# Patient Record
Sex: FEMALE | Race: WHITE | NOT HISPANIC OR LATINO | Employment: OTHER | URBAN - METROPOLITAN AREA
[De-identification: names, ages, dates, MRNs, and addresses within clinical notes are randomized per-mention and may not be internally consistent; named-entity substitution may affect disease eponyms.]

---

## 2023-02-03 PROBLEM — D64.9 ANEMIA: Status: ACTIVE | Noted: 2023-02-03

## 2023-02-03 PROBLEM — R91.1 PULMONARY NODULE: Status: ACTIVE | Noted: 2023-02-03

## 2023-02-03 PROBLEM — I07.1 TRICUSPID REGURGITATION: Status: ACTIVE | Noted: 2023-02-03

## 2023-02-03 PROBLEM — T14.8XXA HEMATOMA: Status: ACTIVE | Noted: 2023-02-03

## 2023-02-03 PROBLEM — E03.9 HYPOTHYROIDISM: Status: ACTIVE | Noted: 2023-02-03

## 2023-02-03 PROBLEM — S52.91XD: Status: ACTIVE | Noted: 2023-02-03

## 2023-02-03 PROBLEM — E78.5 HYPERLIPIDEMIA: Status: ACTIVE | Noted: 2023-02-03

## 2023-02-03 PROBLEM — M17.9 OSTEOARTHRITIS OF KNEE: Status: ACTIVE | Noted: 2023-02-03

## 2023-02-03 PROBLEM — I10 HYPERTENSION: Status: ACTIVE | Noted: 2023-02-03

## 2023-02-03 PROBLEM — J18.9 COMMUNITY ACQUIRED PNEUMONIA: Status: ACTIVE | Noted: 2023-02-03

## 2023-02-03 PROBLEM — M54.16 LUMBAR RADICULOPATHY: Status: ACTIVE | Noted: 2023-02-03

## 2023-02-03 RX ORDER — AMLODIPINE BESYLATE 2.5 MG/1
3 TABLET ORAL DAILY
COMMUNITY
Start: 2017-09-14 | End: 2023-03-17 | Stop reason: SDUPTHER

## 2023-02-03 RX ORDER — LEVOTHYROXINE SODIUM 50 UG/1
1 TABLET ORAL DAILY
COMMUNITY
Start: 2017-09-14 | End: 2023-03-17 | Stop reason: SDUPTHER

## 2023-03-17 ENCOUNTER — OFFICE VISIT (OUTPATIENT)
Dept: PRIMARY CARE | Facility: CLINIC | Age: 88
End: 2023-03-17
Payer: MEDICARE

## 2023-03-17 VITALS
TEMPERATURE: 97.9 F | HEIGHT: 62 IN | SYSTOLIC BLOOD PRESSURE: 147 MMHG | HEART RATE: 103 BPM | WEIGHT: 116 LBS | DIASTOLIC BLOOD PRESSURE: 74 MMHG | BODY MASS INDEX: 21.35 KG/M2

## 2023-03-17 DIAGNOSIS — I10 PRIMARY HYPERTENSION: ICD-10-CM

## 2023-03-17 DIAGNOSIS — R26.89 BALANCE DISORDER: ICD-10-CM

## 2023-03-17 DIAGNOSIS — E03.8 OTHER SPECIFIED HYPOTHYROIDISM: Primary | ICD-10-CM

## 2023-03-17 PROBLEM — J18.9 COMMUNITY ACQUIRED PNEUMONIA: Status: RESOLVED | Noted: 2023-02-03 | Resolved: 2023-03-17

## 2023-03-17 PROCEDURE — 1036F TOBACCO NON-USER: CPT | Performed by: INTERNAL MEDICINE

## 2023-03-17 PROCEDURE — 99214 OFFICE O/P EST MOD 30 MIN: CPT | Performed by: INTERNAL MEDICINE

## 2023-03-17 PROCEDURE — 3078F DIAST BP <80 MM HG: CPT | Performed by: INTERNAL MEDICINE

## 2023-03-17 PROCEDURE — 3077F SYST BP >= 140 MM HG: CPT | Performed by: INTERNAL MEDICINE

## 2023-03-17 PROCEDURE — 1157F ADVNC CARE PLAN IN RCRD: CPT | Performed by: INTERNAL MEDICINE

## 2023-03-17 PROCEDURE — 1159F MED LIST DOCD IN RCRD: CPT | Performed by: INTERNAL MEDICINE

## 2023-03-17 PROCEDURE — 1160F RVW MEDS BY RX/DR IN RCRD: CPT | Performed by: INTERNAL MEDICINE

## 2023-03-17 RX ORDER — AMLODIPINE BESYLATE 2.5 MG/1
7.5 TABLET ORAL DAILY
Qty: 270 TABLET | Refills: 3 | Status: SHIPPED | OUTPATIENT
Start: 2023-03-17 | End: 2024-04-30 | Stop reason: ALTCHOICE

## 2023-03-17 RX ORDER — CANE
EACH MISCELLANEOUS
Qty: 1 EACH | Refills: 0 | Status: SHIPPED | OUTPATIENT
Start: 2023-03-17

## 2023-03-17 RX ORDER — LEVOTHYROXINE SODIUM 50 UG/1
50 TABLET ORAL DAILY
Qty: 90 TABLET | Refills: 3 | Status: SHIPPED | OUTPATIENT
Start: 2023-03-17 | End: 2024-04-30 | Stop reason: SDUPTHER

## 2023-03-17 ASSESSMENT — ENCOUNTER SYMPTOMS
FEVER: 0
COUGH: 0
POLYDIPSIA: 0
OCCASIONAL FEELINGS OF UNSTEADINESS: 0
DEPRESSION: 0
SHORTNESS OF BREATH: 0
LOSS OF SENSATION IN FEET: 0
CHILLS: 0
PALPITATIONS: 0

## 2023-07-19 ENCOUNTER — OFFICE VISIT (OUTPATIENT)
Dept: PRIMARY CARE | Facility: CLINIC | Age: 88
End: 2023-07-19
Payer: MEDICARE

## 2023-07-19 VITALS
WEIGHT: 112 LBS | HEART RATE: 87 BPM | SYSTOLIC BLOOD PRESSURE: 156 MMHG | BODY MASS INDEX: 20.49 KG/M2 | TEMPERATURE: 97.9 F | DIASTOLIC BLOOD PRESSURE: 61 MMHG

## 2023-07-19 DIAGNOSIS — Z00.00 ANNUAL PHYSICAL EXAM: Primary | ICD-10-CM

## 2023-07-19 DIAGNOSIS — F40.298 FEAR OF FALLING: ICD-10-CM

## 2023-07-19 DIAGNOSIS — E03.8 OTHER SPECIFIED HYPOTHYROIDISM: ICD-10-CM

## 2023-07-19 DIAGNOSIS — D64.9 ANEMIA, UNSPECIFIED TYPE: ICD-10-CM

## 2023-07-19 DIAGNOSIS — R26.89 BALANCE DISORDER: ICD-10-CM

## 2023-07-19 DIAGNOSIS — I10 PRIMARY HYPERTENSION: ICD-10-CM

## 2023-07-19 DIAGNOSIS — E55.9 VITAMIN D DEFICIENCY: ICD-10-CM

## 2023-07-19 PROCEDURE — G0439 PPPS, SUBSEQ VISIT: HCPCS | Performed by: INTERNAL MEDICINE

## 2023-07-19 PROCEDURE — 1170F FXNL STATUS ASSESSED: CPT | Performed by: INTERNAL MEDICINE

## 2023-07-19 PROCEDURE — 1159F MED LIST DOCD IN RCRD: CPT | Performed by: INTERNAL MEDICINE

## 2023-07-19 PROCEDURE — 1160F RVW MEDS BY RX/DR IN RCRD: CPT | Performed by: INTERNAL MEDICINE

## 2023-07-19 PROCEDURE — 1036F TOBACCO NON-USER: CPT | Performed by: INTERNAL MEDICINE

## 2023-07-19 PROCEDURE — 3077F SYST BP >= 140 MM HG: CPT | Performed by: INTERNAL MEDICINE

## 2023-07-19 PROCEDURE — 3078F DIAST BP <80 MM HG: CPT | Performed by: INTERNAL MEDICINE

## 2023-07-19 PROCEDURE — 1157F ADVNC CARE PLAN IN RCRD: CPT | Performed by: INTERNAL MEDICINE

## 2023-07-19 RX ORDER — ATORVASTATIN CALCIUM 40 MG/1
TABLET, FILM COATED ORAL
COMMUNITY
Start: 2017-07-27 | End: 2023-07-19 | Stop reason: ALTCHOICE

## 2023-07-19 ASSESSMENT — ENCOUNTER SYMPTOMS
CHILLS: 0
LOSS OF SENSATION IN FEET: 0
OCCASIONAL FEELINGS OF UNSTEADINESS: 1
PALPITATIONS: 0
COUGH: 0
DEPRESSION: 0
FEVER: 0
SHORTNESS OF BREATH: 0
POLYDIPSIA: 0

## 2023-07-19 ASSESSMENT — ACTIVITIES OF DAILY LIVING (ADL)
TAKING_MEDICATION: INDEPENDENT
DOING_HOUSEWORK: NEEDS ASSISTANCE
DRESSING: INDEPENDENT
MANAGING_FINANCES: INDEPENDENT
BATHING: INDEPENDENT
GROCERY_SHOPPING: NEEDS ASSISTANCE

## 2023-07-19 ASSESSMENT — PATIENT HEALTH QUESTIONNAIRE - PHQ9: 1. LITTLE INTEREST OR PLEASURE IN DOING THINGS: NOT AT ALL

## 2023-07-19 NOTE — PROGRESS NOTES
Subjective   Reason for Visit: Lucita Chandra is an 89 y.o. female here for a Medicare Wellness visit.     Past Medical, Surgical, and Family History reviewed and updated in chart.    Reviewed all medications by prescribing practitioner or clinical pharmacist (such as prescriptions, OTCs, herbal therapies and supplements) and documented in the medical record.    Patient presents today for an annual wellness exam    Medical history and surgical history updated today  Medication list reconciled and updated  Patient denies vision issues at this time  Patient denies hearing issues at this time  Current diet: stable  Current exercise habit: minimal  Follows with a dentist: Yes    Patient counseled about available preventative health vaccinations and provided with opportunity to update them with our office or through prescription to preferred pharmacy.    Significant time spent in counseling of the patient about home safety measures.  She has not had a fall but has a fear of falling and multiple balance issues.  I advised her about home physical therapy given her homebound status lack of driving and reliance on outside transportation to leave the home.  I have ordered home physical therapy as part of today's assessment to assure that we would prevent the first fall and keep the patient as independent and safe at home as possible.  In addition to this discussion we discussed her sleep and nighttime awakening for urination she currently denies wanting to intervene on either of these issues through investigation or medication at this time.  The patient wants to take an approach that is more minimalistic in regards to her health care.  She wants to focus on independence safety and quality of life.  The patient does have family 2 daughters and a son who check on her regularly she has at least 2 phone calls a day checking up on her home safety by family and visits on a regular basis from her son when things are needed.   Family also does all grocery shopping.  They are regularly involved in her life.  I asked if she needed a home health aide or additional assistance at this time or to be directed to a  to discuss additional family and home services and she declined this referral and further assistance at this time.        Patient Care Team:  Vazquez Butts MD as PCP - General  Kishan Rendon MD as PCP - [employer] PCP     Review of Systems   Constitutional:  Negative for chills and fever.   Respiratory:  Negative for cough and shortness of breath.    Cardiovascular:  Negative for chest pain and palpitations.   Endocrine: Negative for polydipsia and polyuria.       Objective   Vitals:  /61   Pulse 87   Temp 36.6 °C (97.9 °F)   Wt 50.8 kg (112 lb)   BMI 20.49 kg/m²       Physical Exam  Constitutional:       Appearance: Normal appearance.   HENT:      Head: Normocephalic and atraumatic.      Right Ear: Tympanic membrane normal.      Left Ear: Tympanic membrane normal.      Nose: Nose normal.      Mouth/Throat:      Mouth: Mucous membranes are moist.   Eyes:      Extraocular Movements: Extraocular movements intact.      Conjunctiva/sclera: Conjunctivae normal.      Pupils: Pupils are equal, round, and reactive to light.   Neck:      Thyroid: No thyroid mass, thyromegaly or thyroid tenderness.      Vascular: No carotid bruit.   Cardiovascular:      Rate and Rhythm: Normal rate and regular rhythm.      Heart sounds: No murmur heard.     No friction rub. No gallop.   Pulmonary:      Effort: Pulmonary effort is normal. No respiratory distress.      Breath sounds: No wheezing, rhonchi or rales.   Abdominal:      General: Bowel sounds are normal.      Palpations: Abdomen is soft.      Tenderness: There is no abdominal tenderness. There is no guarding.   Musculoskeletal:      Cervical back: Neck supple. No tenderness.      Right lower leg: No edema.      Left lower leg: No edema.      Comments:  Reduced cervical lordosis. Exaggerated thoracic kyphosis   Lymphadenopathy:      Cervical: No cervical adenopathy.   Skin:     Coloration: Skin is not jaundiced.   Neurological:      General: No focal deficit present.      Mental Status: She is alert and oriented to person, place, and time.   Psychiatric:         Mood and Affect: Mood normal.         Assessment/Plan   Problem List Items Addressed This Visit       Anemia    Relevant Orders    Vitamin D, Total    CBC    Comprehensive Metabolic Panel    TSH with reflex to Free T4 if abnormal    Hypertension    Relevant Orders    Vitamin D, Total    CBC    Comprehensive Metabolic Panel    TSH with reflex to Free T4 if abnormal    Hypothyroidism    Relevant Orders    Vitamin D, Total    CBC    Comprehensive Metabolic Panel    TSH with reflex to Free T4 if abnormal     Other Visit Diagnoses       Annual physical exam    -  Primary    Balance disorder        Fear of falling        Vitamin D deficiency        Relevant Orders    Vitamin D, Total

## 2023-08-17 NOTE — PROGRESS NOTES
Subjective   Patient ID: Lucita Chandra is a 89 y.o. female who presents for No chief complaint on file..  HPI  89-year-old female patient of Dr. Butts here for follow-up visit concerned about rectal bleeding.  She was last seen by Dr. Lenz on 7/19.  - Seen by eye clinic noted to have retinal hemorrhage in the right, new onset, recommended consideration for management of diabetes or hypertension in the clinic. Appears to be a new finding as this was not present in January, recommended monitoring and followup in 2 months.       History  -HTN-on amlodipine 7.5mg/day   -Hypothyroidism-on levothyroxine 50 mcg  -Balance disorder  - Macular degeneration fo the left eye   Current Outpatient Medications   Medication Instructions    amLODIPine (NORVASC) 7.5 mg, oral, Daily    cane device Dispense 4 prong cane for ambulation.    levothyroxine (SYNTHROID, LEVOXYL) 50 mcg, oral, Daily        Objective     /56   Pulse 87   Temp 36.4 °C (97.6 °F)   Wt 52.2 kg (115 lb)   BMI 21.03 kg/m²     Physical Exam  General: Alert and oriented, in no apparent distress   HEENT: No conjunctival erythema, no external facial lesions   Lungs: Breathing comfortably  Skin: No evidence of skin breakdown.  Neuro: AAO x 3, answering questions appropriately, no obvious cranial nerve deficits    Assessment/Plan   Problem List Items Addressed This Visit    None  Visit Diagnoses       Retinal hemorrhage noted on examination of right eye    -  Primary  With as of yet uncertain etiology. Blood pressure readings WNL on repeat measurements and asymptomatic. No significant evidence of diabetes or prediabetes.   Will discuss further with optho regarding any additional etiologies that may warrant additional workup. Will followup with  otpho.     Healthcare maintenance        Relevant Orders    POCT glycosylated hemoglobin (Hb A1C) manually resulted (Completed)

## 2023-08-18 ENCOUNTER — OFFICE VISIT (OUTPATIENT)
Dept: PRIMARY CARE | Facility: CLINIC | Age: 88
End: 2023-08-18
Payer: MEDICARE

## 2023-08-18 VITALS
HEART RATE: 87 BPM | TEMPERATURE: 97.6 F | BODY MASS INDEX: 21.03 KG/M2 | SYSTOLIC BLOOD PRESSURE: 111 MMHG | DIASTOLIC BLOOD PRESSURE: 56 MMHG | WEIGHT: 115 LBS

## 2023-08-18 DIAGNOSIS — H35.61 RETINAL HEMORRHAGE NOTED ON EXAMINATION OF RIGHT EYE: Primary | ICD-10-CM

## 2023-08-18 DIAGNOSIS — I10 PRIMARY HYPERTENSION: ICD-10-CM

## 2023-08-18 DIAGNOSIS — Z00.00 HEALTHCARE MAINTENANCE: ICD-10-CM

## 2023-08-18 LAB — POC HEMOGLOBIN A1C: 5.7 % (ref 4.2–6.5)

## 2023-08-18 PROCEDURE — 83036 HEMOGLOBIN GLYCOSYLATED A1C: CPT | Performed by: INTERNAL MEDICINE

## 2023-08-18 PROCEDURE — 1157F ADVNC CARE PLAN IN RCRD: CPT | Performed by: INTERNAL MEDICINE

## 2023-08-18 PROCEDURE — 1159F MED LIST DOCD IN RCRD: CPT | Performed by: INTERNAL MEDICINE

## 2023-08-18 PROCEDURE — 3074F SYST BP LT 130 MM HG: CPT | Performed by: INTERNAL MEDICINE

## 2023-08-18 PROCEDURE — 99213 OFFICE O/P EST LOW 20 MIN: CPT | Performed by: INTERNAL MEDICINE

## 2023-08-18 PROCEDURE — 1160F RVW MEDS BY RX/DR IN RCRD: CPT | Performed by: INTERNAL MEDICINE

## 2023-08-18 PROCEDURE — 3078F DIAST BP <80 MM HG: CPT | Performed by: INTERNAL MEDICINE

## 2023-08-18 PROCEDURE — 1036F TOBACCO NON-USER: CPT | Performed by: INTERNAL MEDICINE

## 2023-08-18 NOTE — ASSESSMENT & PLAN NOTE
Blood pressure readings wnl and on the low side, may consider de-escalation in management in future visit if persists. Clinically asymptomatic.

## 2023-11-20 ENCOUNTER — APPOINTMENT (OUTPATIENT)
Dept: PRIMARY CARE | Facility: CLINIC | Age: 88
End: 2023-11-20
Payer: MEDICARE

## 2023-12-28 ENCOUNTER — OFFICE VISIT (OUTPATIENT)
Dept: PRIMARY CARE | Facility: CLINIC | Age: 88
End: 2023-12-28
Payer: MEDICARE

## 2023-12-28 VITALS
DIASTOLIC BLOOD PRESSURE: 77 MMHG | WEIGHT: 116.2 LBS | BODY MASS INDEX: 21.38 KG/M2 | HEART RATE: 83 BPM | HEIGHT: 62 IN | SYSTOLIC BLOOD PRESSURE: 149 MMHG

## 2023-12-28 DIAGNOSIS — M19.042 ARTHRITIS OF FINGER OF LEFT HAND: ICD-10-CM
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
## 2023-12-28 DIAGNOSIS — R26.89 BALANCE DISORDER: Primary | ICD-10-CM

## 2023-12-28 PROCEDURE — 3078F DIAST BP <80 MM HG: CPT | Performed by: INTERNAL MEDICINE

## 2023-12-28 PROCEDURE — 1160F RVW MEDS BY RX/DR IN RCRD: CPT | Performed by: INTERNAL MEDICINE

## 2023-12-28 PROCEDURE — 99214 OFFICE O/P EST MOD 30 MIN: CPT | Performed by: INTERNAL MEDICINE

## 2023-12-28 PROCEDURE — 1159F MED LIST DOCD IN RCRD: CPT | Performed by: INTERNAL MEDICINE

## 2023-12-28 PROCEDURE — 1036F TOBACCO NON-USER: CPT | Performed by: INTERNAL MEDICINE

## 2023-12-28 PROCEDURE — 3077F SYST BP >= 140 MM HG: CPT | Performed by: INTERNAL MEDICINE

## 2023-12-28 RX ORDER — DICLOFENAC SODIUM 10 MG/G
4 GEL TOPICAL 4 TIMES DAILY
Qty: 100 G | Refills: 1 | Status: SHIPPED | OUTPATIENT
Start: 2023-12-28 | End: 2024-05-16 | Stop reason: SDUPTHER

## 2023-12-28 ASSESSMENT — ENCOUNTER SYMPTOMS
PALPITATIONS: 0
CHILLS: 0
FEVER: 0
POLYDIPSIA: 0
COUGH: 0
SHORTNESS OF BREATH: 0

## 2023-12-28 NOTE — PATIENT INSTRUCTIONS
Please consider the Life alert we discussed today. It would be excellent for your safety at home.     Please consider letting family hire someone to tighten your stair railing.     I have ordered physical therapy for you, for coming to help to help balance and prevent future falls..     Please consider the COVID 19 and RSV vaccine booster from your pharmacy.

## 2023-12-28 NOTE — PROGRESS NOTES
Subjective   Patient ID: Lucita Chandra is a 89 y.o. female who presents for Follow-up.    89-year-old female presents today for general follow-up.  Since her last encounter there have been no major health changes she has not been to the hospital emergency room or seeing the new physician.  No surgeries or procedures.  She continues to live alone the family checking on her regularly.  She does not have a life alert system in place.  She was advised about the benefits of this for home safety and recommended strongly to consider 1.  She does carry a cell phone around but is not on her 24/7.  We know this because she had a fall event approximately 1 month ago on the [employer] weather day when she was working out back in her garden.  The fall was without injury but she was unable to get up on her own.  She scooted herself up to her porch and sat there for 2 hours until someone saw her from her neighborhood and called for medical assistance.  There have been no falls since that time.  She does experience a balance disorder and requires a cane to ambulate.  She ambulates slow and with a low step height.  Family also reports that there is a problematic railing on her 6 steps in her home which she does use.  She has been refusing repair to stabilize the loose railing.  She was educated by me today regarding the importance of this but refuses to have it addressed.  We discussed physical therapy for balance and strengthening and home safety she was open to this option.  She is currently homebound she does not drive she does not own a car she does not have the strength and proprioception to leave the home under her own power without assistance.  She continues to eat meals regularly and has no significant weight fluctuations from her assessment earlier this year.  Family reports some mild cognitive decline noted but primarily because she gets anxious about her pension being deposited and thinks that it is not being  "deposited.  No wandering forgetfulness or more advanced behaviors noted.         Review of Systems   Constitutional:  Negative for chills and fever.   Respiratory:  Negative for cough and shortness of breath.    Cardiovascular:  Negative for chest pain and palpitations.   Endocrine: Negative for polydipsia and polyuria.       Objective   /77 (BP Location: Left arm, Patient Position: Sitting, BP Cuff Size: Adult)   Pulse 83   Ht 1.575 m (5' 2\")   Wt 52.7 kg (116 lb 3.2 oz)   BMI 21.25 kg/m²     Physical Exam  Constitutional:       Appearance: Normal appearance.   HENT:      Head: Normocephalic and atraumatic.   Eyes:      Extraocular Movements: Extraocular movements intact.      Pupils: Pupils are equal, round, and reactive to light.   Neck:      Thyroid: No thyroid mass or thyromegaly.      Vascular: No carotid bruit.   Cardiovascular:      Rate and Rhythm: Normal rate and regular rhythm.      Heart sounds: No murmur heard.     No friction rub. No gallop.   Pulmonary:      Effort: No respiratory distress.      Breath sounds: No wheezing, rhonchi or rales.   Musculoskeletal:      Cervical back: Neck supple.      Right lower leg: No edema.      Left lower leg: No edema.   Lymphadenopathy:      Cervical: No cervical adenopathy.   Neurological:      Mental Status: She is alert.         Assessment/Plan   Problem List Items Addressed This Visit    None  Visit Diagnoses         Codes    Balance disorder    -  Primary R26.89    Fall, initial encounter     W19.XXXA    Arthritis of finger of left hand     M19.042    Relevant Medications    diclofenac sodium (Voltaren) 1 % gel gel               "

## 2024-04-30 ENCOUNTER — OFFICE VISIT (OUTPATIENT)
Dept: PRIMARY CARE | Facility: CLINIC | Age: 89
End: 2024-04-30
Payer: MEDICARE

## 2024-04-30 ENCOUNTER — LAB (OUTPATIENT)
Dept: LAB | Facility: LAB | Age: 89
End: 2024-04-30
Payer: MEDICARE

## 2024-04-30 VITALS
TEMPERATURE: 98 F | DIASTOLIC BLOOD PRESSURE: 64 MMHG | BODY MASS INDEX: 20.16 KG/M2 | WEIGHT: 110.2 LBS | HEART RATE: 76 BPM | SYSTOLIC BLOOD PRESSURE: 148 MMHG

## 2024-04-30 DIAGNOSIS — D64.9 ANEMIA, UNSPECIFIED TYPE: ICD-10-CM

## 2024-04-30 DIAGNOSIS — I10 PRIMARY HYPERTENSION: ICD-10-CM

## 2024-04-30 DIAGNOSIS — R26.89 BALANCE DISORDER: ICD-10-CM

## 2024-04-30 DIAGNOSIS — E03.8 OTHER SPECIFIED HYPOTHYROIDISM: Primary | ICD-10-CM

## 2024-04-30 DIAGNOSIS — R35.0 URINARY FREQUENCY: ICD-10-CM

## 2024-04-30 DIAGNOSIS — E03.8 OTHER SPECIFIED HYPOTHYROIDISM: ICD-10-CM

## 2024-04-30 LAB
ALBUMIN SERPL BCP-MCNC: 4.1 G/DL (ref 3.4–5)
ANION GAP SERPL CALC-SCNC: 16 MMOL/L (ref 10–20)
APPEARANCE UR: CLEAR
BILIRUB UR STRIP.AUTO-MCNC: NEGATIVE MG/DL
BUN SERPL-MCNC: 31 MG/DL (ref 6–23)
CALCIUM SERPL-MCNC: 9 MG/DL (ref 8.6–10.6)
CHLORIDE SERPL-SCNC: 104 MMOL/L (ref 98–107)
CO2 SERPL-SCNC: 26 MMOL/L (ref 21–32)
COLOR UR: YELLOW
CREAT SERPL-MCNC: 0.97 MG/DL (ref 0.5–1.05)
EGFRCR SERPLBLD CKD-EPI 2021: 53 ML/MIN/1.73M*2
ERYTHROCYTE [DISTWIDTH] IN BLOOD BY AUTOMATED COUNT: 24.3 % (ref 11.5–14.5)
GLUCOSE SERPL-MCNC: 98 MG/DL (ref 74–99)
GLUCOSE UR STRIP.AUTO-MCNC: NORMAL MG/DL
HCT VFR BLD AUTO: 31.7 % (ref 36–46)
HGB BLD-MCNC: 10 G/DL (ref 12–16)
HYALINE CASTS #/AREA URNS AUTO: ABNORMAL /LPF
KETONES UR STRIP.AUTO-MCNC: NEGATIVE MG/DL
LEUKOCYTE ESTERASE UR QL STRIP.AUTO: ABNORMAL
MCH RBC QN AUTO: 29.4 PG (ref 26–34)
MCHC RBC AUTO-ENTMCNC: 31.5 G/DL (ref 32–36)
MCV RBC AUTO: 93 FL (ref 80–100)
MUCOUS THREADS #/AREA URNS AUTO: ABNORMAL /LPF
NITRITE UR QL STRIP.AUTO: NEGATIVE
NRBC BLD-RTO: 0.3 /100 WBCS (ref 0–0)
PH UR STRIP.AUTO: 6 [PH]
PHOSPHATE SERPL-MCNC: 4.3 MG/DL (ref 2.5–4.9)
PLATELET # BLD AUTO: 257 X10*3/UL (ref 150–450)
POTASSIUM SERPL-SCNC: 4.7 MMOL/L (ref 3.5–5.3)
PROT UR STRIP.AUTO-MCNC: ABNORMAL MG/DL
RBC # BLD AUTO: 3.4 X10*6/UL (ref 4–5.2)
RBC # UR STRIP.AUTO: NEGATIVE /UL
RBC #/AREA URNS AUTO: ABNORMAL /HPF
SODIUM SERPL-SCNC: 141 MMOL/L (ref 136–145)
SP GR UR STRIP.AUTO: 1.02
T4 FREE SERPL-MCNC: 1.39 NG/DL (ref 0.78–1.48)
TSH SERPL-ACNC: 4.97 MIU/L (ref 0.44–3.98)
UROBILINOGEN UR STRIP.AUTO-MCNC: NORMAL MG/DL
WBC # BLD AUTO: 6.5 X10*3/UL (ref 4.4–11.3)
WBC #/AREA URNS AUTO: ABNORMAL /HPF

## 2024-04-30 PROCEDURE — 87086 URINE CULTURE/COLONY COUNT: CPT

## 2024-04-30 PROCEDURE — 1159F MED LIST DOCD IN RCRD: CPT | Performed by: INTERNAL MEDICINE

## 2024-04-30 PROCEDURE — 1157F ADVNC CARE PLAN IN RCRD: CPT | Performed by: INTERNAL MEDICINE

## 2024-04-30 PROCEDURE — 99214 OFFICE O/P EST MOD 30 MIN: CPT | Performed by: INTERNAL MEDICINE

## 2024-04-30 PROCEDURE — 36415 COLL VENOUS BLD VENIPUNCTURE: CPT

## 2024-04-30 PROCEDURE — 1126F AMNT PAIN NOTED NONE PRSNT: CPT | Performed by: INTERNAL MEDICINE

## 2024-04-30 PROCEDURE — 3078F DIAST BP <80 MM HG: CPT | Performed by: INTERNAL MEDICINE

## 2024-04-30 PROCEDURE — 85027 COMPLETE CBC AUTOMATED: CPT

## 2024-04-30 PROCEDURE — 81001 URINALYSIS AUTO W/SCOPE: CPT

## 2024-04-30 PROCEDURE — 84439 ASSAY OF FREE THYROXINE: CPT

## 2024-04-30 PROCEDURE — 80069 RENAL FUNCTION PANEL: CPT

## 2024-04-30 PROCEDURE — 3077F SYST BP >= 140 MM HG: CPT | Performed by: INTERNAL MEDICINE

## 2024-04-30 PROCEDURE — 1036F TOBACCO NON-USER: CPT | Performed by: INTERNAL MEDICINE

## 2024-04-30 PROCEDURE — G2211 COMPLEX E/M VISIT ADD ON: HCPCS | Performed by: INTERNAL MEDICINE

## 2024-04-30 PROCEDURE — 1160F RVW MEDS BY RX/DR IN RCRD: CPT | Performed by: INTERNAL MEDICINE

## 2024-04-30 PROCEDURE — 84443 ASSAY THYROID STIM HORMONE: CPT

## 2024-04-30 RX ORDER — LEVOTHYROXINE SODIUM 50 UG/1
50 TABLET ORAL DAILY
Qty: 90 TABLET | Refills: 3 | Status: SHIPPED | OUTPATIENT
Start: 2024-04-30 | End: 2024-05-16 | Stop reason: SDUPTHER

## 2024-04-30 RX ORDER — AMLODIPINE AND VALSARTAN 5; 160 MG/1; MG/1
1 TABLET ORAL DAILY
Qty: 90 TABLET | Refills: 3 | Status: SHIPPED | OUTPATIENT
Start: 2024-04-30 | End: 2024-05-16 | Stop reason: SDUPTHER

## 2024-04-30 ASSESSMENT — ENCOUNTER SYMPTOMS
PALPITATIONS: 0
POLYDIPSIA: 0
SHORTNESS OF BREATH: 0
COUGH: 0
FEVER: 0
CHILLS: 0
FREQUENCY: 1

## 2024-04-30 ASSESSMENT — PAIN SCALES - GENERAL: PAINLEVEL: 0-NO PAIN

## 2024-04-30 NOTE — PROGRESS NOTES
Subjective   Patient ID: Lucita Chandra is a 89 y.o. female who presents for Follow-up (3 month, daughter think Lucita might have a UTI ).    89-year-old female presents today for routine follow-up since her last visit there have been no major health concerns of the develop.  She has not had any falls injuries or loss of independence at home.  She still does not have a life alert device for [employer] the need arose she was again educated in great detail regarding the benefits of such a device and my recommendations that she get 1.  We reviewed her medications including her blood pressure which is mildly elevated and with her history of eye issues like to optimize her blood pressure better and we discussed how the improve upon her control.  This will also simplify her medications to making them all once a day.  Family is a bit concerned that she might have some urinary tract symptoms and would like her tested currently on examination the patient has no abdominal pain and she denies symptoms of dysuria but has history of chronic urinary frequency and urgency.  She denies needing treatment for her irritable bladder which is chronic and longstanding estimating approximately 4 trips to the bathroom at night.         Review of Systems   Constitutional:  Negative for chills and fever.   Respiratory:  Negative for cough and shortness of breath.    Cardiovascular:  Negative for chest pain and palpitations.   Endocrine: Negative for polydipsia and polyuria.   Genitourinary:  Positive for frequency and urgency.       Objective   /64   Pulse 76   Temp 36.7 °C (98 °F) (Temporal)   Wt 50 kg (110 lb 3.2 oz)   BMI 20.16 kg/m²     Physical Exam  Constitutional:       Appearance: Normal appearance.   HENT:      Head: Normocephalic and atraumatic.   Eyes:      Extraocular Movements: Extraocular movements intact.      Pupils: Pupils are equal, round, and reactive to light.   Neck:      Thyroid: No thyroid mass or  thyromegaly.      Vascular: No carotid bruit.   Cardiovascular:      Rate and Rhythm: Normal rate and regular rhythm.      Heart sounds: Murmur (2/6) heard.      No friction rub. No gallop.   Pulmonary:      Effort: No respiratory distress.      Breath sounds: No wheezing, rhonchi or rales.   Musculoskeletal:      Cervical back: Neck supple.      Right lower leg: No edema.      Left lower leg: No edema.   Lymphadenopathy:      Cervical: No cervical adenopathy.   Neurological:      Mental Status: She is alert.         Assessment/Plan   Problem List Items Addressed This Visit             ICD-10-CM    Anemia D64.9    Relevant Orders    TSH with reflex to Free T4 if abnormal    CBC    Renal function panel    Urinalysis with Reflex Microscopic    Urine Culture    Hypertension I10    Relevant Medications    levothyroxine (Synthroid, Levoxyl) 50 mcg tablet    amlodipine-valsartan (Exforge) 5-160 mg tablet    Other Relevant Orders    TSH with reflex to Free T4 if abnormal    CBC    Renal function panel    Urinalysis with Reflex Microscopic    Urine Culture    Hypothyroidism - Primary E03.9    Relevant Medications    levothyroxine (Synthroid, Levoxyl) 50 mcg tablet    Other Relevant Orders    TSH with reflex to Free T4 if abnormal    CBC    Renal function panel    Urinalysis with Reflex Microscopic    Urine Culture     Other Visit Diagnoses         Codes    Balance disorder     R26.89    Relevant Medications    levothyroxine (Synthroid, Levoxyl) 50 mcg tablet    Other Relevant Orders    TSH with reflex to Free T4 if abnormal    CBC    Renal function panel    Urinalysis with Reflex Microscopic    Urine Culture    Urinary frequency     R35.0    Relevant Orders    TSH with reflex to Free T4 if abnormal    CBC    Renal function panel    Urinalysis with Reflex Microscopic    Urine Culture

## 2024-04-30 NOTE — PATIENT INSTRUCTIONS
Based on your previous eye doctors exam and reports as well as your current blood pressure and medications I am advising the following:    -Continue with your current dose of amlodipine 2.5 mg twice a day until your new medications arrive from your mail order pharmacy.  When the new medications arrive, stop the amlodipine 2.5 mg twice a day you currently have a total.  That medication will be replaced by a new medication.    -The new medication is called amlodipine-valsartan 5/160.  This medication is 1 tablet daily, will improve upon your blood pressure control to better protect your vision, and is only needed once a day to do all of that.  Again, when this medication arrives and you started you will stop the amlodipine 2.5 mg.    -I have refilled your levothyroxine 50 it will continue to come from your mail-order pharmacy you do not need to make any additional changes.    Revisit in 4 months.

## 2024-05-01 LAB — BACTERIA UR CULT: NORMAL

## 2024-05-16 DIAGNOSIS — R26.89 BALANCE DISORDER: ICD-10-CM

## 2024-05-16 DIAGNOSIS — E03.8 OTHER SPECIFIED HYPOTHYROIDISM: ICD-10-CM

## 2024-05-16 DIAGNOSIS — I10 PRIMARY HYPERTENSION: ICD-10-CM

## 2024-05-16 DIAGNOSIS — M19.042 ARTHRITIS OF FINGER OF LEFT HAND: ICD-10-CM

## 2024-05-16 RX ORDER — AMLODIPINE AND VALSARTAN 5; 160 MG/1; MG/1
1 TABLET ORAL DAILY
Qty: 90 TABLET | Refills: 3 | Status: SHIPPED | OUTPATIENT
Start: 2024-05-16 | End: 2025-05-16

## 2024-05-16 RX ORDER — DICLOFENAC SODIUM 10 MG/G
4 GEL TOPICAL 4 TIMES DAILY
Qty: 100 G | Refills: 1 | Status: SHIPPED | OUTPATIENT
Start: 2024-05-16

## 2024-05-16 RX ORDER — LEVOTHYROXINE SODIUM 50 UG/1
50 TABLET ORAL DAILY
Qty: 90 TABLET | Refills: 3 | Status: SHIPPED | OUTPATIENT
Start: 2024-05-16 | End: 2025-05-16

## 2024-08-30 ENCOUNTER — APPOINTMENT (OUTPATIENT)
Dept: PRIMARY CARE | Facility: CLINIC | Age: 89
End: 2024-08-30
Payer: MEDICARE

## 2024-08-30 VITALS
TEMPERATURE: 97.8 F | HEART RATE: 71 BPM | WEIGHT: 115 LBS | BODY MASS INDEX: 21.03 KG/M2 | SYSTOLIC BLOOD PRESSURE: 128 MMHG | DIASTOLIC BLOOD PRESSURE: 65 MMHG

## 2024-08-30 DIAGNOSIS — R26.89 BALANCE DISORDER: ICD-10-CM

## 2024-08-30 DIAGNOSIS — E03.8 OTHER SPECIFIED HYPOTHYROIDISM: Primary | ICD-10-CM

## 2024-08-30 DIAGNOSIS — I10 PRIMARY HYPERTENSION: ICD-10-CM

## 2024-08-30 PROCEDURE — 1157F ADVNC CARE PLAN IN RCRD: CPT | Performed by: INTERNAL MEDICINE

## 2024-08-30 PROCEDURE — 3074F SYST BP LT 130 MM HG: CPT | Performed by: INTERNAL MEDICINE

## 2024-08-30 PROCEDURE — 3078F DIAST BP <80 MM HG: CPT | Performed by: INTERNAL MEDICINE

## 2024-08-30 PROCEDURE — 99213 OFFICE O/P EST LOW 20 MIN: CPT | Performed by: INTERNAL MEDICINE

## 2024-08-30 PROCEDURE — 1036F TOBACCO NON-USER: CPT | Performed by: INTERNAL MEDICINE

## 2024-08-30 PROCEDURE — 1160F RVW MEDS BY RX/DR IN RCRD: CPT | Performed by: INTERNAL MEDICINE

## 2024-08-30 PROCEDURE — G2211 COMPLEX E/M VISIT ADD ON: HCPCS | Performed by: INTERNAL MEDICINE

## 2024-08-30 PROCEDURE — 1159F MED LIST DOCD IN RCRD: CPT | Performed by: INTERNAL MEDICINE

## 2024-08-30 RX ORDER — LEVOTHYROXINE SODIUM 50 UG/1
50 TABLET ORAL DAILY
Qty: 90 TABLET | Refills: 3 | Status: SHIPPED | OUTPATIENT
Start: 2024-08-30 | End: 2025-08-30

## 2024-08-30 RX ORDER — AMLODIPINE AND VALSARTAN 5; 160 MG/1; MG/1
1 TABLET ORAL DAILY
Qty: 90 TABLET | Refills: 3 | Status: SHIPPED | OUTPATIENT
Start: 2024-08-30 | End: 2025-08-30

## 2024-08-30 ASSESSMENT — ENCOUNTER SYMPTOMS
COUGH: 0
FEVER: 0
SHORTNESS OF BREATH: 0
PALPITATIONS: 0
CHILLS: 0
POLYDIPSIA: 0

## 2024-08-30 NOTE — PROGRESS NOTES
Subjective   Patient ID: Lucita Chandra is a 89 y.o. female who presents for Follow-up.    89-year-old female presents today for routine follow-up.  She has a history of hypothyroidism and hypertension.  Her blood pressure is well-controlled today on manual evaluation with the medication adjustments were made at last visit.  She been taking that medication well although complains mildly that it is a large pill.  She did stop her thyroid medication for multiple weeks after reading the patient's start of her last refill and felt like she was too nervous to take it.  She has been reassured by her family regarding this and resume taking it recently.  She was again reassured by me today of the importance need of this medication and also reminded this is not a new medication for her and she has been out of the long time and that her thyroid does not keep up on its own she needs the assistance of that medication for her.  She did not at any time experience any side effects from using that medication it was simply reading the answer that made her nervous.  There have been no new issues at home, this includes no falls injuries traumas visits to the hospital or medical procedures planned or performed.  She does have some baseline balance difficulties and there is some concerns about the risk and the benefit of the shower and it was discussed that a transfer bench may be beneficial for her for the future to assure safety in the bathroom with transfers in and out of the tub.  Otherwise there were no concerns needed to be addressed immediately at this time.  Will plan for blood work and follow-up but now is not a good opportunity given her inconsistency using the thyroid medication.  Her past readings were stable when used consistently and we will check at follow-up.         Review of Systems   Constitutional:  Negative for chills and fever.   Respiratory:  Negative for cough and shortness of breath.    Cardiovascular:   Negative for chest pain and palpitations.   Endocrine: Negative for polydipsia and polyuria.       Objective   /65 (BP Location: Right arm, Patient Position: Sitting, BP Cuff Size: Adult)   Pulse 71   Temp 36.6 °C (97.8 °F) (Temporal)   Wt 52.2 kg (115 lb)   BMI 21.03 kg/m²     Physical Exam  Constitutional:       Appearance: Normal appearance.   HENT:      Head: Normocephalic and atraumatic.   Eyes:      Extraocular Movements: Extraocular movements intact.      Pupils: Pupils are equal, round, and reactive to light.   Neck:      Thyroid: No thyroid mass or thyromegaly.      Vascular: No carotid bruit.   Cardiovascular:      Rate and Rhythm: Normal rate and regular rhythm.      Heart sounds: No murmur heard.     No friction rub. No gallop.   Pulmonary:      Effort: No respiratory distress.      Breath sounds: No wheezing, rhonchi or rales.   Musculoskeletal:      Cervical back: Neck supple.      Right lower leg: No edema.      Left lower leg: No edema.   Lymphadenopathy:      Cervical: No cervical adenopathy.   Neurological:      Mental Status: She is alert.         Assessment/Plan   Problem List Items Addressed This Visit             ICD-10-CM    Hypertension I10    Relevant Medications    levothyroxine (Synthroid, Levoxyl) 50 mcg tablet    amlodipine-valsartan (Exforge) 5-160 mg tablet    Hypothyroidism - Primary E03.9    Relevant Medications    levothyroxine (Synthroid, Levoxyl) 50 mcg tablet     Other Visit Diagnoses         Codes    Balance disorder     R26.89    Relevant Medications    levothyroxine (Synthroid, Levoxyl) 50 mcg tablet    Other Relevant Orders    Miscellaneous DME

## 2025-03-04 ENCOUNTER — APPOINTMENT (OUTPATIENT)
Dept: PRIMARY CARE | Facility: CLINIC | Age: OVER 89
End: 2025-03-04
Payer: MEDICARE

## 2025-03-21 ENCOUNTER — APPOINTMENT (OUTPATIENT)
Dept: PRIMARY CARE | Facility: HOSPITAL | Age: OVER 89
End: 2025-03-21
Payer: MEDICARE

## 2025-03-21 VITALS
HEART RATE: 98 BPM | TEMPERATURE: 97.8 F | SYSTOLIC BLOOD PRESSURE: 180 MMHG | DIASTOLIC BLOOD PRESSURE: 79 MMHG | BODY MASS INDEX: 20.49 KG/M2 | WEIGHT: 112 LBS

## 2025-03-21 DIAGNOSIS — E03.8 OTHER SPECIFIED HYPOTHYROIDISM: Primary | ICD-10-CM

## 2025-03-21 DIAGNOSIS — Z51.81 MEDICATION MONITORING ENCOUNTER: ICD-10-CM

## 2025-03-21 DIAGNOSIS — I10 PRIMARY HYPERTENSION: ICD-10-CM

## 2025-03-21 DIAGNOSIS — R13.19 ESOPHAGEAL DYSPHAGIA: ICD-10-CM
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
## 2025-03-21 DIAGNOSIS — R26.89 BALANCE DISORDER: ICD-10-CM

## 2025-03-21 DIAGNOSIS — R29.6 MULTIPLE FALLS: ICD-10-CM

## 2025-03-21 PROCEDURE — 1036F TOBACCO NON-USER: CPT | Performed by: INTERNAL MEDICINE

## 2025-03-21 PROCEDURE — 3078F DIAST BP <80 MM HG: CPT | Performed by: INTERNAL MEDICINE

## 2025-03-21 PROCEDURE — 1157F ADVNC CARE PLAN IN RCRD: CPT | Performed by: INTERNAL MEDICINE

## 2025-03-21 PROCEDURE — 99213 OFFICE O/P EST LOW 20 MIN: CPT | Performed by: INTERNAL MEDICINE

## 2025-03-21 PROCEDURE — 1159F MED LIST DOCD IN RCRD: CPT | Performed by: INTERNAL MEDICINE

## 2025-03-21 PROCEDURE — G2211 COMPLEX E/M VISIT ADD ON: HCPCS | Performed by: INTERNAL MEDICINE

## 2025-03-21 PROCEDURE — 1126F AMNT PAIN NOTED NONE PRSNT: CPT | Performed by: INTERNAL MEDICINE

## 2025-03-21 PROCEDURE — 3077F SYST BP >= 140 MM HG: CPT | Performed by: INTERNAL MEDICINE

## 2025-03-21 PROCEDURE — 1160F RVW MEDS BY RX/DR IN RCRD: CPT | Performed by: INTERNAL MEDICINE

## 2025-03-21 RX ORDER — AMLODIPINE AND VALSARTAN 5; 160 MG/1; MG/1
1 TABLET ORAL DAILY
Qty: 90 TABLET | Refills: 3 | Status: SHIPPED | OUTPATIENT
Start: 2025-03-21 | End: 2026-03-21

## 2025-03-21 RX ORDER — LEVOTHYROXINE SODIUM 50 UG/1
50 TABLET ORAL DAILY
Qty: 90 TABLET | Refills: 3 | Status: SHIPPED | OUTPATIENT
Start: 2025-03-21 | End: 2026-03-21

## 2025-03-21 ASSESSMENT — ENCOUNTER SYMPTOMS
POLYDIPSIA: 0
PALPITATIONS: 0
FEVER: 0
CHILLS: 0
SHORTNESS OF BREATH: 0
COUGH: 0

## 2025-03-21 ASSESSMENT — PAIN SCALES - GENERAL: PAINLEVEL_OUTOF10: 0-NO PAIN

## 2025-03-21 NOTE — ASSESSMENT & PLAN NOTE
Orders:    levothyroxine (Synthroid, Levoxyl) 50 mcg tablet; Take 1 tablet (50 mcg) by mouth once daily.    Renal function panel; Future    TSH with reflex to Free T4 if abnormal; Future    CBC and Auto Differential; Future    Hepatic function panel; Future

## 2025-03-21 NOTE — ASSESSMENT & PLAN NOTE
Orders:    amlodipine-valsartan (Exforge) 5-160 mg tablet; Take 1 tablet by mouth once daily.    levothyroxine (Synthroid, Levoxyl) 50 mcg tablet; Take 1 tablet (50 mcg) by mouth once daily.    Renal function panel; Future    TSH with reflex to Free T4 if abnormal; Future    CBC and Auto Differential; Future    Hepatic function panel; Future

## 2025-03-21 NOTE — PROGRESS NOTES
Subjective   Patient ID: Lucita Chandra is a 89 y.o. female who presents for Follow-up.    2 episodes of possible falls since fabio. Getting an apple watch for emergency use, phone calls, and fall detection.     Narrow bathroom. Has grab bar. Has shower stool and handheld shower head. Advised non slip bathtub surface.     89-year-old female presents today for routine follow-up she is due for annual blood testing and reevaluation of her thyroid I have ordered blood testing at this time.  She self-reports some recent episodes of recurrent esophageal dysphagia dating back a few months.  Food sticking in the upper one third of the esophagus without regurgitation or vomiting.  Recently episodes have been coming more frequent.  No aspiration coughing just the sensation of food sticking in the upper esophagus recently 1 episode did have a dyne aphasia associated.  No bleeding or coughing up blood.    Balance is becoming an increasing concern there have been multiple home falls since last encounter family is taken preventative steps including grab bars safety equipment and a fall monitoring smart watch that will also allow for emergency calls.  Patient is very reluctant to any interventions refuses to leave home and will not allow people into the home for physical therapy, or assistant care.  It was not for the presence of the family and their commitment to her independence and providing additional assistance the patient would not be safe at home and we are seeing a progression steadily towards increasing demands and lessening safety even with their interventions.  Family recognizes increasing cognitive decline poor short-term memory and anxiety, most specifically with the anxiety related to financial stress even though the patient is very well off and does not struggle for money.         Review of Systems   Constitutional:  Negative for chills and fever.   Respiratory:  Negative for cough and shortness of breath.     Cardiovascular:  Negative for chest pain and palpitations.   Endocrine: Negative for polydipsia and polyuria.       Objective   /79 (BP Location: Left arm, Patient Position: Sitting, BP Cuff Size: Adult)   Pulse 98   Temp 36.6 °C (97.8 °F) (Temporal)   Wt 50.8 kg (112 lb)   BMI 20.49 kg/m²     Physical Exam  Constitutional:       Appearance: Normal appearance.   HENT:      Head: Normocephalic and atraumatic.   Eyes:      Extraocular Movements: Extraocular movements intact.      Pupils: Pupils are equal, round, and reactive to light.   Neck:      Thyroid: No thyroid mass or thyromegaly.   Cardiovascular:      Rate and Rhythm: Normal rate and regular rhythm.      Heart sounds: No murmur heard.     No friction rub. No gallop.   Pulmonary:      Effort: No respiratory distress.      Breath sounds: No wheezing, rhonchi or rales.   Musculoskeletal:      Cervical back: Neck supple.      Right lower leg: No edema.      Left lower leg: No edema.   Neurological:      Mental Status: She is alert.         Assessment/Plan   Assessment & Plan  Other specified hypothyroidism    Orders:    levothyroxine (Synthroid, Levoxyl) 50 mcg tablet; Take 1 tablet (50 mcg) by mouth once daily.    Renal function panel; Future    TSH with reflex to Free T4 if abnormal; Future    CBC and Auto Differential; Future    Hepatic function panel; Future    Fall, initial encounter         Primary hypertension    Orders:    amlodipine-valsartan (Exforge) 5-160 mg tablet; Take 1 tablet by mouth once daily.    levothyroxine (Synthroid, Levoxyl) 50 mcg tablet; Take 1 tablet (50 mcg) by mouth once daily.    Renal function panel; Future    TSH with reflex to Free T4 if abnormal; Future    CBC and Auto Differential; Future    Hepatic function panel; Future    Balance disorder    Orders:    levothyroxine (Synthroid, Levoxyl) 50 mcg tablet; Take 1 tablet (50 mcg) by mouth once daily.    Referral to Physical Therapy; Future    Esophageal  dysphagia    Orders:    FL GI esophagram; Future    Medication monitoring encounter    Orders:    Renal function panel; Future    TSH with reflex to Free T4 if abnormal; Future    CBC and Auto Differential; Future    Hepatic function panel; Future    Multiple falls    Orders:    Referral to Physical Therapy; Future

## 2025-03-22 LAB
ALBUMIN SERPL-MCNC: 4.1 G/DL (ref 3.6–5.1)
ALBUMIN SERPL-MCNC: 4.1 G/DL (ref 3.6–5.1)
ALBUMIN/GLOB SERPL: 1.6 (CALC) (ref 1–2.5)
ALP SERPL-CCNC: 96 U/L (ref 37–153)
ALT SERPL-CCNC: 13 U/L (ref 6–29)
AST SERPL-CCNC: 19 U/L (ref 10–35)
BASOPHILS # BLD AUTO: 40 CELLS/UL (ref 0–200)
BASOPHILS NFR BLD AUTO: 0.5 %
BILIRUB DIRECT SERPL-MCNC: 0.1 MG/DL
BILIRUB INDIRECT SERPL-MCNC: 0.5 MG/DL (CALC) (ref 0.2–1.2)
BILIRUB SERPL-MCNC: 0.6 MG/DL (ref 0.2–1.2)
BUN SERPL-MCNC: 28 MG/DL (ref 7–25)
BUN/CREAT SERPL: 32 (CALC) (ref 6–22)
CALCIUM SERPL-MCNC: 8.8 MG/DL (ref 8.6–10.4)
CHLORIDE SERPL-SCNC: 107 MMOL/L (ref 98–110)
CO2 SERPL-SCNC: 24 MMOL/L (ref 20–32)
CREAT SERPL-MCNC: 0.87 MG/DL (ref 0.6–0.95)
EGFRCR SERPLBLD CKD-EPI 2021: 60 ML/MIN/1.73M2
EOSINOPHIL # BLD AUTO: 379 CELLS/UL (ref 15–500)
EOSINOPHIL NFR BLD AUTO: 4.8 %
ERYTHROCYTE [DISTWIDTH] IN BLOOD BY AUTOMATED COUNT: 21.8 % (ref 11–15)
GLOBULIN SER CALC-MCNC: 2.6 G/DL (CALC) (ref 1.9–3.7)
GLUCOSE SERPL-MCNC: 102 MG/DL (ref 65–139)
HCT VFR BLD AUTO: 29.3 % (ref 35–45)
HGB BLD-MCNC: 9.4 G/DL (ref 11.7–15.5)
LYMPHOCYTES # BLD AUTO: 1493 CELLS/UL (ref 850–3900)
LYMPHOCYTES NFR BLD AUTO: 18.9 %
MCH RBC QN AUTO: 29.7 PG (ref 27–33)
MCHC RBC AUTO-ENTMCNC: 32.1 G/DL (ref 32–36)
MCV RBC AUTO: 92.7 FL (ref 80–100)
MONOCYTES # BLD AUTO: 1082 CELLS/UL (ref 200–950)
MONOCYTES NFR BLD AUTO: 13.7 %
MORPHOLOGY BLD-IMP: ABNORMAL
NEUTROPHILS # BLD AUTO: 4906 CELLS/UL (ref 1500–7800)
NEUTROPHILS NFR BLD AUTO: 62.1 %
PHOSPHATE SERPL-MCNC: 4.4 MG/DL (ref 2.1–4.3)
PLATELET # BLD AUTO: 250 THOUSAND/UL (ref 140–400)
PMV BLD REES-ECKER: 11.4 FL (ref 7.5–12.5)
POTASSIUM SERPL-SCNC: 4.6 MMOL/L (ref 3.5–5.3)
PROT SERPL-MCNC: 6.7 G/DL (ref 6.1–8.1)
RBC # BLD AUTO: 3.16 MILLION/UL (ref 3.8–5.1)
SERVICE CMNT-IMP: ABNORMAL
SODIUM SERPL-SCNC: 141 MMOL/L (ref 135–146)
T4 FREE SERPL-MCNC: 1.1 NG/DL (ref 0.8–1.8)
TSH SERPL-ACNC: 10.1 MIU/L (ref 0.4–4.5)
WBC # BLD AUTO: 7.9 THOUSAND/UL (ref 3.8–10.8)

## 2025-03-26 ENCOUNTER — HOSPITAL ENCOUNTER (OUTPATIENT)
Dept: RADIOLOGY | Facility: CLINIC | Age: OVER 89
Discharge: HOME | End: 2025-03-26
Payer: MEDICARE

## 2025-03-26 ENCOUNTER — OFFICE VISIT (OUTPATIENT)
Dept: PRIMARY CARE | Facility: CLINIC | Age: OVER 89
End: 2025-03-26
Payer: MEDICARE

## 2025-03-26 VITALS — TEMPERATURE: 97.8 F | SYSTOLIC BLOOD PRESSURE: 159 MMHG | HEART RATE: 71 BPM | DIASTOLIC BLOOD PRESSURE: 72 MMHG

## 2025-03-26 DIAGNOSIS — M54.50 ACUTE LEFT-SIDED LOW BACK PAIN WITHOUT SCIATICA: ICD-10-CM

## 2025-03-26 DIAGNOSIS — M54.50 ACUTE MIDLINE LOW BACK PAIN WITHOUT SCIATICA: ICD-10-CM
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
## 2025-03-26 PROCEDURE — G2211 COMPLEX E/M VISIT ADD ON: HCPCS | Performed by: INTERNAL MEDICINE

## 2025-03-26 PROCEDURE — 1157F ADVNC CARE PLAN IN RCRD: CPT | Performed by: INTERNAL MEDICINE

## 2025-03-26 PROCEDURE — 1160F RVW MEDS BY RX/DR IN RCRD: CPT | Performed by: INTERNAL MEDICINE

## 2025-03-26 PROCEDURE — 99213 OFFICE O/P EST LOW 20 MIN: CPT | Performed by: INTERNAL MEDICINE

## 2025-03-26 PROCEDURE — 72100 X-RAY EXAM L-S SPINE 2/3 VWS: CPT

## 2025-03-26 PROCEDURE — 1036F TOBACCO NON-USER: CPT | Performed by: INTERNAL MEDICINE

## 2025-03-26 PROCEDURE — 1159F MED LIST DOCD IN RCRD: CPT | Performed by: INTERNAL MEDICINE

## 2025-03-26 PROCEDURE — 3078F DIAST BP <80 MM HG: CPT | Performed by: INTERNAL MEDICINE

## 2025-03-26 PROCEDURE — 3077F SYST BP >= 140 MM HG: CPT | Performed by: INTERNAL MEDICINE

## 2025-03-26 ASSESSMENT — ENCOUNTER SYMPTOMS
COUGH: 0
FEVER: 0
PALPITATIONS: 0
SHORTNESS OF BREATH: 0
POLYDIPSIA: 0
CHILLS: 0
BACK PAIN: 1

## 2025-03-26 NOTE — PATIENT INSTRUCTIONS
For the tylenol take the following dose:     Regular strength tylenol (325mg tablets) take 2 tablets 4 times a day while awake. Example: take 2 tablets when waking up, 2 at lunch, 2 at dinner, and 2 before bed.     Extra strength tylenol (500mg tablets) take 2 tablets 3 times a day. Example: 2 when waking up, 2 tablets in the early afternoon around 2pm, and 2 tablets at bedtime.

## 2025-03-26 NOTE — PROGRESS NOTES
Subjective   Patient ID: Lucita Chandra is a 89 y.o. female who presents for Fall (PT FELL ON SUNDAY) and Back Pain (LOWER BACK PAIN).    Fall at home on Sunday, informed family at a later time. Back pain that is positional, hurts more when leaning backwards.  No radiculopathy noted.  She has been able to ambulate since that fall without difficulty.  No hip pain no buttock pain she is able to sit without pain in the perineum or buttocks.  No urinary retention or fecal incontinence.  There is point tenderness over the left low back more specifically over the region of the left paraspinal muscle.  Lesser tenderness over the midline of the back.  Given the patient's advanced age, fragility, and left sided but midline back pain advised for low back x-ray for evaluation of compression fracture or other associated injury.  Suspect predominantly muscular pain and advised with proper medication follow-up.  Additional options pending results of testing.    Fall  Pertinent negatives include no fever.   Back Pain  Pertinent negatives include no chest pain or fever.        Review of Systems   Constitutional:  Negative for chills and fever.   Respiratory:  Negative for cough and shortness of breath.    Cardiovascular:  Negative for chest pain and palpitations.   Endocrine: Negative for polydipsia and polyuria.   Musculoskeletal:  Positive for back pain.       Objective   /72   Pulse 71   Temp 36.6 °C (97.8 °F) (Temporal)     Physical Exam  Constitutional:       Appearance: Normal appearance.   Musculoskeletal:        Legs:    Neurological:      General: No focal deficit present.      Mental Status: She is alert.      Sensory: No sensory deficit.      Motor: No weakness.      Gait: Gait abnormal (Chronic and unchanged).         Assessment/Plan   Assessment & Plan  Fall, initial encounter    Orders:    XR lumbar spine 2-3 views; Future    Acute left-sided low back pain without sciatica    Orders:    XR lumbar spine  2-3 views; Future    Acute midline low back pain without sciatica    Orders:    XR lumbar spine 2-3 views; Future